# Patient Record
Sex: FEMALE | Race: BLACK OR AFRICAN AMERICAN
[De-identification: names, ages, dates, MRNs, and addresses within clinical notes are randomized per-mention and may not be internally consistent; named-entity substitution may affect disease eponyms.]

---

## 2017-01-20 NOTE — EMERGENCY ROOM REPORT
History of Present Illness


General


Chief Complaint:  Female Urogenital Problems


Source:  Patient





Present Illness


HPI


41-year-old female presents to ED complaining of burning urination x2 days.  

States dysuria with urinary hesitancy area and denies any fevers or chills.  

Denies flank pain.  Denies nausea or vomiting.  Also notes some whitish curdish 

discharge.  Has frequent history of yeast infections.  No other aggravating 

relieving factors.  Denies any other associated symptoms


Allergies:  


Coded Allergies:  


     No Known Allergies (Unverified , 8/19/13)





Patient History


Past Medical History:  none


Past Surgical History:  none


Pertinent Family History:  none


Social History:  Denies: alcohol use, drug use, smoking


Last Menstrual Period:  1 week


Pregnant Now:  No


Immunizations:  UTD


Reviewed Nursing Documentation:  PMH: Agreed, PSxH: Agreed





Nursing Documentation-PMH


Past Medical History:  No Stated History





Review of Systems


All Other Systems:  negative except mentioned in HPI





Physical Exam





Vital Signs








  Date Time  Temp Pulse Resp B/P Pulse Ox O2 Delivery O2 Flow Rate FiO2


 


1/20/17 08:29 98.1 73 18 98/62 98 Room Air  








Sp02 EP Interpretation:  reviewed, normal


General Appearance:  no apparent distress, alert, GCS 15, non-toxic, obese


Head:  normocephalic, atraumatic


Eyes:  bilateral eye PERRL, bilateral eye normal inspection


ENT:  hearing grossly normal, normal pharynx, no angioedema, normal voice


Neck:  full range of motion, supple/symm/no masses


Respiratory:  chest non-tender, lungs clear, normal breath sounds, speaking 

full sentences


Cardiovascular #1:  regular rate, rhythm, no edema


Cardiovascular #2:  2+ carotid (R), 2+ carotid (L), 2+ radial (R), 2+ radial (L)

, 2+ dorsalis pedis (R), 2+ dorsalis pedis (L)


Gastrointestinal:  normal bowel sounds, non tender, soft, non-distended, no 

guarding, no rebound


Rectal:  deferred


Genitourinary:  normal inspection, no CVA tenderness


Musculoskeletal:  back normal, gait/station normal, normal range of motion, non-

tender


Neurologic:  alert, oriented x3, responsive, motor strength/tone normal, 

sensory intact, speech normal


Psychiatric:  judgement/insight normal, memory normal, mood/affect normal, no 

suicidal/homicidal ideation


Reflexes:  3+ bicep (R), 3+ bicep (L), 3+ tricep (R), 3+ tricep (L), 3+ knee (R)

, 3+ knee (L)


Skin:  normal color, no rash, warm/dry, well hydrated


Lymphatic:  no adenopathy





Medical Decision Making


Diagnostic Impression:  


 Primary Impression:  


 Cystitis


ER Course


Hospital Course 


41-year-old female presents to ED complaining of dysuria with suprapubic pain.





Differential diagnoses include: UTI, cystitis, pyelonephritis 





Clinical course


Patient placed on stretcher.  After initial history and physical I ordered UA, 

urine pregnancy.





UA grossly positive. we will treat with abx.  patient will also be given Rx for 

diflucan





Diagnosis - cystitis





Stable and discharged home with prescriptions for Rx keflex, diflucan.  

Instructed to followup with PMD.  Return to ED if symptoms recur or worsen





Labs








Test


  1/20/17


08:40


 


Urine Color Pale yellow 


 


Urine Appearance


  Slightly


cloudy


 


Urine pH 8 (4.5-8.0) 


 


Urine Specific Gravity


  1.010


(1.005-1.035)


 


Urine Protein


  Negative


(NEGATIVE)


 


Urine Glucose (UA)


  Negative


(NEGATIVE)


 


Urine Ketones


  Negative


(NEGATIVE)


 


Urine Occult Blood 1+ (NEGATIVE) 


 


Urine Nitrite


  Positive


(NEGATIVE)


 


Urine Bilirubin


  Negative


(NEGATIVE)


 


Urine Urobilinogen


  Normal MG/DL


(0.0-1.0)


 


Urine Leukocyte Esterase 2+ (NEGATIVE) 


 


Urine RBC


  2-4 /HPF (0 -


2)


 


Urine WBC


  20-30 /HPF (0


- 2)


 


Urine Squamous Epithelial


Cells Few /LPF


(NONE/OCC)


 


Urine Bacteria


  Moderate /HPF


(NONE)


 


Urine HCG, Qualitative Negative 











Last Vital Signs








  Date Time  Temp Pulse Resp B/P Pulse Ox O2 Delivery O2 Flow Rate FiO2


 


1/20/17 09:40 98.1 75 17 101/63 99 Room Air  








Status:  improved


Disposition:  HOME, SELF-CARE


Condition:  Stable


Scripts


Ibuprofen* (MOTRIN*) 600 Mg Tablet


600 MG ORAL Q8H Y for For Pain, #30 TAB 0 Refills


   Prov: MATT VERA M.D.         1/20/17 


Fluconazole (DIFLUCAN) 150 Mg Tablet


150 MG PO QWEEK, #2 TAB 0 Refills


   Prov: MATT VERA M.D.         1/20/17 


Cephalexin* (KEFLEX*) 500 Mg Capsule


500 MG ORAL Q6H, #28 CAP 0 Refills


   Prov: MATT VERA M.D.         1/20/17


Patient Instructions:  Urinary Tract Infection, Vaginal Yeast Infection, Adult











MATT VERA M.D. Jan 20, 2017 10:52

## 2017-02-11 NOTE — EMERGENCY ROOM REPORT
History of Present Illness


General


Chief Complaint:  Upper Respiratory Illness


Source:  Patient





Present Illness


HPI


41-year-old female presents to emergency Department complaining of dry cough x3 

weeks with nasal congestion and rhinorrhea.  Patient denies fevers or chills.  

Patient denies history of asthma however she states she is a current smoker.  

She states cough is worse at night and feels as though that she is constantly 

wheezing.  Patient denies productive sputum.  She denies neck pain or 

stiffness.  Patient denies sore throat, abdominal pain,N/V, dysuria, frequency, 

or rashes.   She states she is up-to-date with vaccinations.  Denies CP, 

Palpitations, LOC, AMS, dizziness, Changes in Vision, Sensation, paresthesias, 

or a sudden severe headache.


Allergies:  


Coded Allergies:  


     No Known Allergies (Unverified , 8/19/13)





Patient History


Past Medical History:  see triage record


Past Surgical History:  none


Pertinent Family History:  none


Last Menstrual Period:  2/5/17


Pregnant Now:  No


Immunizations:  UTD


Reviewed Nursing Documentation:  PMH: Agreed, PSxH: Agreed





Nursing Documentation-PMH


Past Medical History:  No History, Except For





Review of Systems


All Other Systems:  negative except mentioned in HPI





Physical Exam





Vital Signs








  Date Time  Temp Pulse Resp B/P Pulse Ox O2 Delivery O2 Flow Rate FiO2


 


2/11/17 14:48 97.9 72 14 106/68 100 Room Air  








Sp02 EP Interpretation:  reviewed, normal


General Appearance:  no apparent distress, alert, GCS 15, non-toxic


Head:  normocephalic, atraumatic


Eyes:  bilateral eye PERRL, bilateral eye normal inspection


ENT:  hearing grossly normal, normal pharynx, no angioedema, normal voice


Neck:  full range of motion, supple/symm/no masses


Respiratory:  chest non-tender, lungs clear, no respiratory distress, no 

accessory muscle use, speaking full sentences - Sentences are inturrupted by 

episodes of dry high pitched coughing. , wheezing


Cardiovascular #1:  regular rate, rhythm, no edema


Rectal:  deferred


Genitourinary:  normal inspection


Musculoskeletal:  back normal, gait/station normal, normal range of motion, non-

tender, no calf tenderness


Neurologic:  alert, oriented x3, responsive, motor strength/tone normal, 

sensory intact, speech normal


Psychiatric:  judgement/insight normal, memory normal, mood/affect normal, no 

suicidal/homicidal ideation


Skin:  normal color, no rash, warm/dry, well hydrated


Lymphatic:  no adenopathy





Medical Decision Making


PA Attestation


Dr. Ruby is my supervising Physician whom patient management has been 

discussed with.


Diagnostic Impression:  


 Primary Impression:  


 Bronchitis


ER Course


41-year-old female presents to emergency Department complaining of dry cough x3 

weeks with nasal congestion and rhinorrhea.  Patient denies fevers or chills.  

Patient denies history of asthma however she states she is a current smoker





Ddx considered but are not limited to URI, pneumonia, PE, strep pharyngitis, 

meningitis.





Vital signs: Pt.is afebrile VS are WNL


H&PE are most consistent with bronchitis, acute





no evidence to suggest bacterial infection at this time. 





ORDERS: none required at this time, the diagnosis is clinical





ED INTERVENTIONS: 


- Albuterol and Atrovent Nebulized treatment.





       - Re-evaluation post nebulized treatment:  Wheezing as improved, however

  scant diffuse expiratory wheezes are still present. 





DISCHARGE: At this time pt. is stable for d/c to home. Will provide printed 

patient care instructions, and any necessary prescriptions. Care plan and 

follow up instructions have been discussed with the patient prior to discharge.





Last Vital Signs








  Date Time  Temp Pulse Resp B/P Pulse Ox O2 Delivery O2 Flow Rate FiO2


 


2/11/17 15:05  72 14   Room Air  


 


2/11/17 14:48 97.9   106/68 100   








Disposition:  HOME, SELF-CARE


Condition:  Stable


Scripts


Guaifenesin (Guaifenesin) 1,200 Mg Tab.er.12h


1200 MG PO BID for 7 Days, #20 TAB


   Prov: Brittni Mccollum         2/11/17 


Albuterol Sulfate* (ALBUTEROL SULFATE MDI*) 8.5 Gm Hfa.aer.ad


2 PUFF INH Q3H, #1 INH 0 Refills


   Prov: Brittni Mccollum         2/11/17 


Codeine/Promethazine Hcl* (PROMETHAZINE-CODEINE SYRUP*) 118 Ml Syrup


5 ML ORAL Q6H Y for For Cough, #236 ML 0 Refills


   Prov: Brittni Mccollum         2/11/17


Patient Instructions:  Acute Bronchitis, Easy-to-Read





Additional Instructions:  


Take medications as directed. 


Follow up with PCP in 3-5 days 


Return sooner to ED if new symptoms occur, or current symptoms become worse. 


Do not drink alcohol, drive, or operate heavy machinery while taking Cough 

Syrup as this may cause drowsiness. 











- Please note that this Emergency Department Report was dictated using [employer]ossier[employer] technology software, occasionally this can lead to 

erroneous entry secondary to interpretation by the dictation equipment.











Brittni Mccollum Feb 11, 2017 15:30

## 2018-08-01 NOTE — EMERGENCY ROOM REPORT
History of Present Illness


General


Chief Complaint:  Skin Rash/Abscess


Source:  Patient





Present Illness


HPI


This patient states that she has a rash under both her breasts and in her 

inguinal region.  She has been diagnosed with candida in the past.  She has 

been trying to keep her skin dry.  She would like to try oral antifungal 

medications.  She has no other complaints.


Allergies:  


Coded Allergies:  


     No Known Allergies (Unverified , 13)





Patient History


Past Medical History:  none


Past Surgical History:  none


Social History:  Denies: smoking, alcohol use, drug use


Last Menstrual Period:  18


Pregnant Now:  No


:  24


Para:  6


Reviewed Nursing Documentation:  PMH: Agreed; PSxH: Agreed





Nursing Documentation-PMH


Past Medical History:  No History, Except For





Review of Systems


All Other Systems:  negative except mentioned in HPI





Physical Exam





Vital Signs








  Date Time  Temp Pulse Resp B/P (MAP) Pulse Ox O2 Delivery O2 Flow Rate FiO2


 


18 09:26 98.2 78 20 101/61 96 Room Air  





 98.2       








Sp02 EP Interpretation:  reviewed, normal


General Appearance:  no apparent distress, alert, GCS 15, non-toxic


Head:  normocephalic, atraumatic


Eyes:  bilateral eye normal inspection, bilateral eye PERRL


ENT:  hearing grossly normal, normal pharynx, no angioedema, normal voice


Neck:  full range of motion, supple/symm/no masses


Respiratory:  no respiratory distress, no retraction, no accessory muscle use, 

speaking full sentences


Rectal:  deferred


Musculoskeletal:  back normal, gait/station normal, normal range of motion


Neurologic:  alert, oriented x3, responsive, motor strength/tone normal, 

sensory intact, speech normal


Psychiatric:  judgement/insight normal, memory normal, mood/affect normal, no 

suicidal/homicidal ideation


Skin:  warm/dry, well hydrated, other - erythematous and wet skin in the 

mammillary folds and inguinal folds bilaterally.


Lymphatic:  no adenopathy





Medical Decision Making


Diagnostic Impression:  


 Primary Impression:  


 Candidal dermatitis


ER Course


This patient has findings on exam consistent with candida dermatitis.  The 

symptoms are mild and are not disseminated.  I will place the patient on a 

couple days of Diflucan as this is very frustrating to her.  I also educated 

her and keeping her skin dry and will place her on topical treatment also.  

Overall, the patient is well-appearing and nontoxic.  She is given return 

precautions and follow up instructions.





Last Vital Signs








  Date Time  Temp Pulse Resp B/P (MAP) Pulse Ox O2 Delivery O2 Flow Rate FiO2


 


18 09:32 98.2  20 101/61 96 Room Air  





 98.2       


 


18 09:26  78      








Disposition:  HOME, SELF-CARE


Condition:  Stable


Referrals:  


[facility],REFERRING (PCP)











Melvi Gonzalez DO Aug 1, 2018 11:06

## 2019-08-21 NOTE — NUR
ED Nurse Note:



Patient walked into ED c/o left leg pain that has been an ongoing issue for 2 
weeks
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
## 2019-08-22 NOTE — NUR
ER DISCHARGE NOTE:

Patient is cleared to be discharged per ERMD, pt is aox4, on room air. Patient 
was given dc and prescription instructions.Patient verbalized understanding of 
it. ID band removed. Patient ambulated out with steady gait with all her 
belongings.

## 2019-08-22 NOTE — NUR
ED Nurse Note:



Patient presents to ER due to low back pain that radiates to LLE for days; 
Reports no recent injury. Patient states the patient is from [facility] about 1 year 
ago. Patient ambulating the room with steady gait. Reports no loss of bladder 
control. Patient also c/o skin irration/ discoloration under the bilateral 
breasts x 4 months. Reports no fever or chills. RN administered Toradol 
injection to left gluteal otf and Rocephin to right gluteal otf. 
Patient tolerated the injection without difficutly.  Patient resting in bed, 
using watch video on cell phone. Bed in lowest position.

## 2019-08-22 NOTE — NUR
ED Nurse Note:



pt left without being seen. per pt "i cant wait any longer I will just come 
back tomorrow"

## 2019-09-09 ENCOUNTER — HOSPITAL ENCOUNTER (EMERGENCY)
Dept: HOSPITAL 72 - EMR | Age: 44
Discharge: HOME | End: 2019-09-09
Payer: COMMERCIAL

## 2019-09-09 VITALS — DIASTOLIC BLOOD PRESSURE: 52 MMHG | SYSTOLIC BLOOD PRESSURE: 118 MMHG

## 2019-09-09 VITALS — WEIGHT: 260 LBS | BODY MASS INDEX: 37.22 KG/M2 | HEIGHT: 70 IN

## 2019-09-09 VITALS — SYSTOLIC BLOOD PRESSURE: 117 MMHG | DIASTOLIC BLOOD PRESSURE: 64 MMHG

## 2019-09-09 VITALS — DIASTOLIC BLOOD PRESSURE: 59 MMHG | SYSTOLIC BLOOD PRESSURE: 124 MMHG

## 2019-09-09 DIAGNOSIS — N30.90: Primary | ICD-10-CM

## 2019-09-09 LAB
APPEARANCE UR: CLEAR
APTT PPP: YELLOW S
GLUCOSE UR STRIP-MCNC: NEGATIVE MG/DL
KETONES UR QL STRIP: NEGATIVE
LEUKOCYTE ESTERASE UR QL STRIP: (no result)
NITRITE UR QL STRIP: NEGATIVE
PH UR STRIP: 7 [PH] (ref 4.5–8)
PROT UR QL STRIP: NEGATIVE
SP GR UR STRIP: 1.01 (ref 1–1.03)
UROBILINOGEN UR-MCNC: NORMAL MG/DL (ref 0–1)

## 2019-09-09 PROCEDURE — 87181 SC STD AGAR DILUTION PER AGT: CPT

## 2019-09-09 PROCEDURE — 87086 URINE CULTURE/COLONY COUNT: CPT

## 2019-09-09 PROCEDURE — 99283 EMERGENCY DEPT VISIT LOW MDM: CPT

## 2019-09-09 PROCEDURE — 81003 URINALYSIS AUTO W/O SCOPE: CPT

## 2019-09-09 NOTE — EMERGENCY ROOM REPORT
History of Present Illness


General


Chief Complaint:  Medication Refill


Source:  Patient





Present Illness


HPI


Is a 43-year-old female with no past medical history.  She presents with chief 

complaint of urinary frequency.  Onset for last couple days.  She was here last 

week for exposure to STD.  She was told that her boyfriend had some bacterial 

infection on his penis.  She was given Rocephin and azithromycin.  She was also 

treated for intertrigo candidiasis with Flagyl.  Patient denies any discharge.  

No bleeding.  Denies any other complaint.


Allergies:  


Coded Allergies:  


     No Known Allergies (Unverified , 8/19/13)





Patient History


Past Medical History:  see triage record, old chart reviewed


Past Surgical History:  none


Pertinent Family History:  none


Social History:  Denies: smoking


Last Menstrual Period:  8/27/19


Pregnant Now:  No


Immunizations:  other


Reviewed Nursing Documentation:  PMH: Agreed; PSxH: Agreed





Nursing Documentation-PMH


Past Medical History:  No Stated History





Review of Systems


Eye:  Denies: eye pain, blurred vision


ENT:  Denies: ear pain, nose congestion, throat swelling


Respiratory:  Denies: cough, shortness of breath


Cardiovascular:  Denies: chest pain, palpitations


Gastrointestinal:  Denies: abdominal pain, diarrhea, nausea, vomiting


Genitourinary:  Reports: dysuria


Musculoskeletal:  Denies: back pain, joint pain


Skin:  Denies: rash


Neurological:  Denies: headache, numbness


Endocrine:  Denies: increased thirst, increased urine


Hematologic/Lymphatic:  Denies: easy bruising


All Other Systems:  negative except mentioned in HPI





Physical Exam





Vital Signs








  Date Time  Temp Pulse Resp B/P (MAP) Pulse Ox O2 Delivery O2 Flow Rate FiO2


 


9/9/19 20:39 98.4 71 18 118/52 (74) 99 Room Air  





Vitals normal


Sp02 EP Interpretation:  reviewed, normal


General Appearance:  well appearing, no apparent distress, alert


Head:  normocephalic, atraumatic


Eyes:  bilateral eye PERRL, bilateral eye EOMI


ENT:  hearing grossly normal, normal pharynx


Neck:  full range of motion, supple, no meningismus


Respiratory:  chest non-tender, lungs clear, normal breath sounds


Cardiovascular #1:  regular rate, rhythm, no murmur


Gastrointestinal:  normal bowel sounds, non tender, no mass, no organomegaly, 

no bruit, non-distended


Musculoskeletal:  back normal, gait/station normal, normal range of motion


Psychiatric:  mood/affect normal





Medical Decision Making


Diagnostic Impression:  


 Primary Impression:  


 Cystitis


ER Course


Patient has UTI.  She also has yeast in the urine.  She has percussion for 

Diflucan but she has not filled it yet.  Will discharge home.  I see no 

evidence of ectopic.  No evidence of pyelonephritis.





Last Vital Signs








  Date Time  Temp Pulse Resp B/P (MAP) Pulse Ox O2 Delivery O2 Flow Rate FiO2


 


9/9/19 21:04 98.4 71 18 118/52 99 Room Air  








Status:  unchanged


Disposition:  HOME, SELF-CARE


Condition:  Stable


Scripts


Levofloxacin* (LEVAQUIN*) 500 Mg Tablet


500 MG ORAL DAILY, #7 TAB


   Prov: Tay Jewell MD         9/9/19





Additional Instructions:  


Follow-up with  in 7 days.  Take your Diflucan.  Return if symptoms worsen.











Tay Jewell MD Sep 9, 2019 21:16

## 2019-09-09 NOTE — NUR
ED Nurse Note:

Pt was seen last week for BV, was treated but had sex before finishing tx, 
denies symptoms at this time, also c/o urinary urgency

## 2019-09-16 ENCOUNTER — HOSPITAL ENCOUNTER (EMERGENCY)
Dept: HOSPITAL 72 - EMR | Age: 44
Discharge: HOME | End: 2019-09-16
Payer: COMMERCIAL

## 2019-09-16 VITALS — DIASTOLIC BLOOD PRESSURE: 80 MMHG | SYSTOLIC BLOOD PRESSURE: 108 MMHG

## 2019-09-16 VITALS — DIASTOLIC BLOOD PRESSURE: 70 MMHG | SYSTOLIC BLOOD PRESSURE: 100 MMHG

## 2019-09-16 VITALS — WEIGHT: 260 LBS | BODY MASS INDEX: 37.22 KG/M2 | HEIGHT: 70 IN

## 2019-09-16 DIAGNOSIS — M54.5: ICD-10-CM

## 2019-09-16 DIAGNOSIS — N39.0: ICD-10-CM

## 2019-09-16 DIAGNOSIS — M54.10: ICD-10-CM

## 2019-09-16 DIAGNOSIS — G89.29: Primary | ICD-10-CM

## 2019-09-16 LAB
APPEARANCE UR: (no result)
APTT PPP: YELLOW S
GLUCOSE UR STRIP-MCNC: NEGATIVE MG/DL
KETONES UR QL STRIP: (no result)
LEUKOCYTE ESTERASE UR QL STRIP: (no result)
NITRITE UR QL STRIP: NEGATIVE
PH UR STRIP: 6 [PH] (ref 4.5–8)
PROT UR QL STRIP: (no result)
SP GR UR STRIP: 1.02 (ref 1–1.03)
UROBILINOGEN UR-MCNC: NORMAL MG/DL (ref 0–1)

## 2019-09-16 PROCEDURE — 96372 THER/PROPH/DIAG INJ SC/IM: CPT

## 2019-09-16 PROCEDURE — 81025 URINE PREGNANCY TEST: CPT

## 2019-09-16 PROCEDURE — 99283 EMERGENCY DEPT VISIT LOW MDM: CPT

## 2019-09-16 PROCEDURE — 81003 URINALYSIS AUTO W/O SCOPE: CPT
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
## 2019-09-16 NOTE — NUR
ED Nurse Note:





pt cleared to be d/c per er provider, pt discharge and aftercare instruction 
provided w/ prescription, pt education done via discussion and handout, pt 
advised to follow up with pcp or return to ed if changes in condition, vss, 
ambulatory w/ steady gait, pt verbalized understanding, pt left w/ all 
belongings.

## 2019-09-16 NOTE — NUR
ED Nurse Note:





pt walked in c/o left side hip and low back pain radiating to left leg, pt 
reports she has sciatica nerve pain for past two wks and half but progressively 
worsening. cms intact, ambulatory w/ steady gait, will cont monitor.

## 2019-12-01 ENCOUNTER — HOSPITAL ENCOUNTER (EMERGENCY)
Dept: HOSPITAL 72 - EMR | Age: 44
Discharge: HOME | End: 2019-12-01
Payer: COMMERCIAL

## 2019-12-01 VITALS — WEIGHT: 250 LBS | BODY MASS INDEX: 35.79 KG/M2 | HEIGHT: 70 IN

## 2019-12-01 VITALS — SYSTOLIC BLOOD PRESSURE: 118 MMHG | DIASTOLIC BLOOD PRESSURE: 73 MMHG

## 2019-12-01 VITALS — SYSTOLIC BLOOD PRESSURE: 110 MMHG | DIASTOLIC BLOOD PRESSURE: 76 MMHG

## 2019-12-01 DIAGNOSIS — B37.9: Primary | ICD-10-CM

## 2019-12-01 LAB
APPEARANCE UR: CLEAR
APTT PPP: (no result) S
GLUCOSE UR STRIP-MCNC: NEGATIVE MG/DL
KETONES UR QL STRIP: NEGATIVE
LEUKOCYTE ESTERASE UR QL STRIP: (no result)
NITRITE UR QL STRIP: NEGATIVE
PH UR STRIP: 7 [PH] (ref 4.5–8)
PROT UR QL STRIP: NEGATIVE
SP GR UR STRIP: 1.01 (ref 1–1.03)
UROBILINOGEN UR-MCNC: NORMAL MG/DL (ref 0–1)

## 2019-12-01 PROCEDURE — 81025 URINE PREGNANCY TEST: CPT

## 2019-12-01 PROCEDURE — 81003 URINALYSIS AUTO W/O SCOPE: CPT

## 2019-12-01 PROCEDURE — 99283 EMERGENCY DEPT VISIT LOW MDM: CPT

## 2019-12-01 NOTE — NUR
ED Nurse Note:

Patient arrived into ED accompanied by  c/o burning urination 
accompanied by urinary frequency. rates her pain a 8/10 pain. onset for the 
last 3 days, reports onset for the past 3 days. patient is alert and oriented 
x4, ambulatory with steady gait, VSS will wait for further orders

## 2019-12-01 NOTE — EMERGENCY ROOM REPORT
History of Present Illness


General


Chief Complaint:  Female Urogenital Problems


Source:  Patient





Present Illness


HPI


44-year-old female presents with whitish discharge x3 days she states she has a 

yeast infection only wants the fluconazole and a refill of her gabapentin she 

denies any fevers chills, no flank pain, patient denies any aggravating or 

leading factors severity is mild patient only wants a refill and wants to leave


Allergies:  


Coded Allergies:  


     No Known Allergies (Unverified , 8/19/13)





Patient History


Past Medical History:  see triage record


Pregnant Now:  No


Reviewed Nursing Documentation:  PMH: Agreed; PSxH: Agreed





Nursing Documentation-PMH


Past Medical History:  No History, Except For


Hx Cardiac Problems:  No - bronchitis





Review of Systems


All Other Systems:  negative except mentioned in HPI





Physical Exam





Vital Signs








  Date Time  Temp Pulse Resp B/P (MAP) Pulse Ox O2 Delivery O2 Flow Rate FiO2


 


12/1/19 08:07 98.2 80 17 110/76 (87) 99 Room Air  








General Appearance:  well appearing, no apparent distress


Head:  normocephalic, atraumatic


Eyes:  bilateral eye PERRL, bilateral eye EOMI


ENT:  hearing grossly normal, normal voice


Neck:  full range of motion, supple


Respiratory:  no respiratory distress, speaking full sentences


Gastrointestinal:  non tender, soft


Genitourinary:  no CVA tenderness


Neurologic:  alert, normal gait


Psychiatric:  mood/affect normal


Skin:  no rash





Medical Decision Making


Diagnostic Impression:  


 Primary Impression:  


 Yeast infection


 Additional Impression:  


 Medication refill


ER Course


[age]-year-old female most likely with a yeast infection will provide fluconazole 

here, patient has been trying the cream without any relief, differential also 

includes PID, vaginitis, cervicitis


Disposition home with return precautions





Last Vital Signs








  Date Time  Temp Pulse Resp B/P (MAP) Pulse Ox O2 Delivery O2 Flow Rate FiO2


 


12/1/19 08:10 98.2 86 17 110/76 99 Room Air  








Disposition:  HOME, SELF-CARE


Condition:  Stable


Scripts


Fluconazole (FLUCONAZOLE) 150 Mg Tablet


150 MG ORAL DAILY, #2 TAB 0 Refills


   Prov: Stephan Retana MD         12/1/19 


Gabapentin* (GABAPENTIN*) 400 Mg Capsule


400 MG ORAL THREE TIMES A DAY, #30 CAP 0 Refills


   Prov: Stephan Retana MD         12/1/19


Referrals:  


[facility]


Patient Instructions:  Vaginal Yeast Infection, Adult





Additional Instructions:  


The patient was provided with discharge instructions, notified to follow-up 

with a primary care doctor and or specialist in the next 24-48 hours, and to 

return to the ED if they have worsening of their symptoms. 





Please note that this report is being documented using DRAGON technology.


This can lead to erroneous entry secondary to incorrect interpretation by the 

dictating instrument.











Stephan Retana MD Dec 1, 2019 08:35

## 2020-02-28 ENCOUNTER — HOSPITAL ENCOUNTER (EMERGENCY)
Dept: HOSPITAL 72 - EMR | Age: 45
Discharge: HOME | End: 2020-02-28
Payer: COMMERCIAL

## 2020-02-28 VITALS — DIASTOLIC BLOOD PRESSURE: 82 MMHG | SYSTOLIC BLOOD PRESSURE: 120 MMHG

## 2020-02-28 VITALS — HEIGHT: 69 IN | WEIGHT: 240 LBS | BODY MASS INDEX: 35.55 KG/M2

## 2020-02-28 VITALS — DIASTOLIC BLOOD PRESSURE: 98 MMHG | SYSTOLIC BLOOD PRESSURE: 110 MMHG

## 2020-02-28 DIAGNOSIS — B37.3: Primary | ICD-10-CM

## 2020-02-28 PROCEDURE — 99282 EMERGENCY DEPT VISIT SF MDM: CPT

## 2020-02-28 NOTE — EMERGENCY ROOM REPORT
History of Present Illness


General


Chief Complaint:  Female Urogenital Problems


Source:  Patient





Present Illness


HPI


44-year-old female presents to the emergency department complaining of thick 

white vaginal discharge x3 days.  Patient reports history of frequent yeast 

infections usually due to Candida.  Patient denies recent antibiotic use.  She 

denies genital lesions she does report some itching.  She denies dysuria, 

hematuria or urinary frequency.  She denies abdominal pain or tenderness.  She 

denies fevers or chills.  She denies suspicion of pregnancy.  Patient is 

requesting to be treated just for yeast infection.  No other aggravating or 

relieving factors at this time


Allergies:  


Coded Allergies:  


     No Known Allergies (Unverified , 8/19/13)





Patient History


Past Medical History:  see triage record


Past Surgical History:  none


Pertinent Family History:  none


Pregnant Now:  No


Reviewed Nursing Documentation:  PMH: Agreed; PSxH: Agreed





Nursing Documentation-PMH


Past Medical History:  No History, Except For


Hx Cardiac Problems:  No - bronchitis





Review of Systems


All Other Systems:  negative except mentioned in HPI





Physical Exam





Vital Signs








  Date Time  Temp Pulse Resp B/P (MAP) Pulse Ox O2 Delivery O2 Flow Rate FiO2


 


2/28/20 18:18 98.1 69 17 120/82 (95) 99 Room Air  








Sp02 EP Interpretation:  reviewed, normal


General Appearance:  no apparent distress, alert, GCS 15, non-toxic


Head:  normocephalic, atraumatic


Eyes:  bilateral eye normal inspection, bilateral eye PERRL


ENT:  hearing grossly normal, normal voice


Neck:  full range of motion


Respiratory:  lungs clear, normal breath sounds, speaking full sentences


Cardiovascular #1:  regular rate, rhythm


Gastrointestinal:  normal bowel sounds, non tender, soft


Rectal:  deferred


Genitourinary:  normal inspection, no CVA tenderness, deferred - by pt.


Musculoskeletal:  normal range of motion, gait/station normal, non-tender


Neurologic:  alert, motor strength/tone normal, oriented x3, sensory intact, 

responsive, speech normal


Psychiatric:  judgement/insight normal


Skin:  no rash, normal color


Lymphatic:  no adenopathy





Medical Decision Making


PA Attestation


Dr. Retana Is my supervising Physician whom patient management has been 

discussed with.


Diagnostic Impression:  


 Primary Impression:  


 Vaginal yeast infection


ER Course


44-year-old female presents to the emergency department complaining of thick 

white vaginal discharge x3 days.  Patient reports history of frequent yeast 

infections usually due to Candida.  Patient denies recent antibiotic use.  She 

denies genital lesions she does report some itching.  She denies dysuria, 

hematuria or urinary frequency.  She denies abdominal pain or tenderness.  She 

denies fevers or chills.  She denies suspicion of pregnancy.  Patient is 

requesting to be treated just for yeast infection.  No other aggravating or 

relieving factors at this time





Ddx considered but are not limited to UTi , Pyelo, STI, Stone, Cystitis, 

vaginal laceration, vaginitis.


Vital signs: are WNL, pt. is afebrile 





H& PE are most consistent with:  Vaginitis 


 


ORDERS:


- None dx is clinical





ED INTERVENTIONS:


 


-Diflucan PO





DISCHARGE: At this time pt. is stable for d/c to home. Will provide printed 

patient care instructions, and any necessary prescriptions. Care plan and 

follow up instructions have been discussed with the patient prior to discharge. 

discussed with the patient prior to discharge.





Last Vital Signs








  Date Time  Temp Pulse Resp B/P (MAP) Pulse Ox O2 Delivery O2 Flow Rate FiO2


 


2/28/20 18:21 98.1  17 120/82 99 Room Air  


 


2/28/20 18:18  69      








Disposition:  HOME, SELF-CARE


Condition:  Stable


Scripts


Fluconazole (FLUCONAZOLE) 100 Mg Tablet


100 MG ORAL DAILY, #3 TAB 0 Refills


   Prov: Brittni Mccollum         2/28/20


Patient Instructions:  Vaginal Yeast Infection, Adult





Additional Instructions:  


Take medications as directed. 





 ** Follow up with a PCP or GYN  within 3 days, even if your symptoms have 

resolved. ** 





Return sooner to ED if new symptoms occur, or current symptoms become worse. 











- Please note that this Emergency Department Report was dictated using [employer] technology software, occasionally this can lead to 

erroneous entry secondary to interpretation by the dictation equipment.











Brittni Mccollum Feb 28, 2020 18:38

## 2020-02-28 NOTE — NUR
ED Nurse Note:



Pt ambulated to ED d/t verbalization of "I think I have a yeast infection". 
Placed on chair. TATIANA, NAD.